# Patient Record
Sex: FEMALE | Race: WHITE
[De-identification: names, ages, dates, MRNs, and addresses within clinical notes are randomized per-mention and may not be internally consistent; named-entity substitution may affect disease eponyms.]

---

## 2019-04-16 ENCOUNTER — HOSPITAL ENCOUNTER (INPATIENT)
Dept: HOSPITAL 5 - TRG | Age: 24
LOS: 2 days | Discharge: HOME | End: 2019-04-18
Attending: OBSTETRICS & GYNECOLOGY | Admitting: OBSTETRICS & GYNECOLOGY
Payer: MEDICAID

## 2019-04-16 DIAGNOSIS — Z3A.40: ICD-10-CM

## 2019-04-16 LAB
HCT VFR BLD CALC: 42.1 % (ref 30.3–42.9)
HGB BLD-MCNC: 13.6 GM/DL (ref 10.1–14.3)
MCHC RBC AUTO-ENTMCNC: 32 % (ref 30–34)
MCV RBC AUTO: 81 FL (ref 79–97)
PLATELET # BLD: 216 K/MM3 (ref 140–440)
RBC # BLD AUTO: 5.22 M/MM3 (ref 3.65–5.03)

## 2019-04-16 PROCEDURE — 96372 THER/PROPH/DIAG INJ SC/IM: CPT

## 2019-04-16 PROCEDURE — 86901 BLOOD TYPING SEROLOGIC RH(D): CPT

## 2019-04-16 PROCEDURE — 86900 BLOOD TYPING SEROLOGIC ABO: CPT

## 2019-04-16 PROCEDURE — 36415 COLL VENOUS BLD VENIPUNCTURE: CPT

## 2019-04-16 PROCEDURE — 85018 HEMOGLOBIN: CPT

## 2019-04-16 PROCEDURE — 86592 SYPHILIS TEST NON-TREP QUAL: CPT

## 2019-04-16 PROCEDURE — 85014 HEMATOCRIT: CPT

## 2019-04-16 PROCEDURE — 85027 COMPLETE CBC AUTOMATED: CPT

## 2019-04-16 PROCEDURE — 86850 RBC ANTIBODY SCREEN: CPT

## 2019-04-16 PROCEDURE — 88307 TISSUE EXAM BY PATHOLOGIST: CPT

## 2019-04-16 NOTE — PROGRESS NOTE
Assessment and Plan





A:


24 yo,  @ 40.1 wks gestation


O+ bld type


GBS negative


Active labor


Category 1 FHTs





P:


Continue routine labor orders


AROM- moderate meconium noted


Pitocin augmentation after 1 hr 


Anticipate 


NICU at delivery








Subjective





- Subjective


Date of service: 19 ()


Principal diagnosis: Active labor


Interval history: 





24 yo,  @ 40.1 wks gestation, pt of [facility], sent to [facility]

from clinic for ctx and cervical dilation. PNC was initiated at 8.1 wks 

gestation. Pregnancy has been uncomplicated.  Labs: O+; Antibody negative; 

Rubella immune; VDRL negative; HBsAg negative; HIV negative; GC/Chlamydia 

negative; GBS negative.


Patient reports: fetal movement normal, contractions, other ("Feels better after

epidural")





Objective





- Vital Signs


Vital Signs: 


                               Vital Signs - 12hr











  19





  14:30 14:35 14:40


 


Temperature   


 


Pulse Rate 75 78 71


 


Respiratory   





Rate   


 


Blood Pressure 117/78  


 


Blood Pressure   





[Left]   


 


O2 Sat by Pulse 97 99 99





Oximetry   














  19





  14:42 14:45 14:50


 


Temperature   


 


Pulse Rate 73 78 80


 


Respiratory   





Rate   


 


Blood Pressure   


 


Blood Pressure   





[Left]   


 


O2 Sat by Pulse 94 99 99





Oximetry   














  19





  14:52 14:55 17:51


 


Temperature 98.5 F  


 


Pulse Rate 79 77 


 


Respiratory 16  18





Rate   


 


Blood Pressure   


 


Blood Pressure 117/78  





[Left]   


 


O2 Sat by Pulse  98 





Oximetry   














  19





  18:44 18:49 18:54


 


Temperature   


 


Pulse Rate 89 91 H 96 H


 


Respiratory   





Rate   


 


Blood Pressure   


 


Blood Pressure   





[Left]   


 


O2 Sat by Pulse 99 99 99





Oximetry   














  19





  18:56 18:59 19:01


 


Temperature   


 


Pulse Rate 96 H 95 H 84


 


Respiratory   





Rate   


 


Blood Pressure 112/68  101/58


 


Blood Pressure   





[Left]   


 


O2 Sat by Pulse  99 





Oximetry   














  19





  19:04 19:06 19:09


 


Temperature   


 


Pulse Rate 80 82 83


 


Respiratory   





Rate   


 


Blood Pressure  99/57 


 


Blood Pressure   





[Left]   


 


O2 Sat by Pulse 99  99





Oximetry   














  19/19





  19:12 19:14 19:17


 


Temperature   


 


Pulse Rate 82 89 78


 


Respiratory   





Rate   


 


Blood Pressure 107/55  116/65


 


Blood Pressure   





[Left]   


 


O2 Sat by Pulse  98 





Oximetry   














  19





  19:19 19:22 19:24


 


Temperature   


 


Pulse Rate 105 H 83 89


 


Respiratory   





Rate   


 


Blood Pressure  115/65 


 


Blood Pressure   





[Left]   


 


O2 Sat by Pulse 99  99





Oximetry   














  19





  19:29 19:33 19:34


 


Temperature   


 


Pulse Rate 89 89 94 H


 


Respiratory   





Rate   


 


Blood Pressure 113/67 122/68 


 


Blood Pressure   





[Left]   


 


O2 Sat by Pulse 99  100





Oximetry   














  19





  19:36


 


Temperature 


 


Pulse Rate 92 H


 


Respiratory 





Rate 


 


Blood Pressure 109/64


 


Blood Pressure 





[Left] 


 


O2 Sat by Pulse 





Oximetry 














- Exam


Breasts: deferred


Cardiovascular: Regular rate


Lungs: Normal air movement


Uterus: Present: other (S=D)


FHR: category 1


Uterine Contraction Monitor Mode: External


Cervical Dilatation: 5


Cervical Effacement Percentage: 80


Fetal station: 0-1


Uterine Contraction Frequency (min): 3-4


Uterine Contraction Pattern: Irregular


Uterine Tone Measurement Phase: Resting


Uterine Contraction Intensity: Moderate


Deep Tendon Reflex Grade: Normal +2





- Labs


Labs: 


                                  Abnormal Labs











  19





  16:45


 


WBC  14.7 H


 


RBC  5.22 H


 


MCH  26 L








                         Laboratory Results - last 24 hr











  19





  16:45 16:45


 


WBC  14.7 H 


 


RBC  5.22 H 


 


Hgb  13.6 


 


Hct  42.1 


 


MCV  81 


 


MCH  26 L 


 


MCHC  32 


 


RDW  14.5 


 


Plt Count  216 


 


Blood Type   O POSITIVE


 


Antibody Screen   Negative

## 2019-04-16 NOTE — HISTORY AND PHYSICAL REPORT
History of Present Illness


Date of examination: 19 (1750)


Date of admission: 


19 16:17





Chief complaint: 





Active labor


History of present illness: 





24 yo,  @ 40.1 wks gestation, pt of [facility], sent to [facility]

from clinic for ctx and cervical dilation. PNC was initiated at 8.1 wks 

gestation. Pregnancy has been uncomplicated.  Labs: O+; Antibody negative; 

Rubella immune; VDRL negative; HBsAg negative; HIV negative; GC/Chlamydia 

negative; GBS negative.





Past History


Past Medical History: no pertinent history


Past Surgical History: no surgical history


GYN History: abnormal PAP smear


Family/Genetic History: none


Social history: , lives with family, full code





- Obstetrical History


Expected Date of Delivery: 04/15/19


Actual Gestation: 40 Week(s) 1 Day(s) 


: 1


Para: 0





Medications and Allergies


                                    Allergies











Allergy/AdvReac Type Severity Reaction Status Date / Time


 


No Known Allergies Allergy   Unverified 19 17:17











Active Meds: 


Active Medications





Ephedrine Sulfate (Ephedrine Sulfate)  10 mg IV Q2M PRN


   PRN Reason: Hypotension


Fentanyl (Sublimaze)  100 mcg IV Q2H PRN


   PRN Reason: Labor Pain


Lactated Ringer's (Lactated Ringers)  1,000 mls @ 125 mls/hr IV AS DIRECT CASIMIRO


Oxytocin/Sodium Chloride (Pitocin/Ns 20 Unit/1000ml Drip)  20 units in 1,000 mls

@ 125 mls/hr IV AS DIRECT CASIMIRO


Oxytocin/Sodium Chloride (Pitocin/Ns 30 Unit/500ml)  30 units in 500 mls @ 4 

mls/hr IV TITR CASIMIRO; Protocol


Lidocaine (Xylocaine 2%)  20 ml INFILTRATI ONCE ONE


   Stop: 19 17:30


Mineral Oil (Mineral Oil)  30 ml PO QHS PRN


   PRN Reason: Constipation


Terbutaline Sulfate (Brethine)  0.25 mg IVP ONCE PRN


   PRN Reason: Hyperstimulation/Hypertonicity











Review of Systems


All systems: negative


Gastrointestinal: abdominal pain (ctxs reported)





- Vital Signs


Vital signs: 


                                   Vital Signs











Pulse BP Pulse Ox


 


 75   117/78   97 


 


 19 14:30  19 14:30  19 14:30








                                        











Temp Pulse Resp BP Pulse Ox


 


 98.5 F   77   16   117/78   98 


 


 19 14:52  19 14:55  19 14:52  19 14:52  19 14:55














- Physical Exam


Breasts: Positive: deferred


Cardiovascular: Regular rate, Normal S1, Normal S2


Lungs: Positive: Normal air movement


Abdomen: Positive: other (gravid)


Uterus: Positive: other (S=D)


Extremities: Positive: normal


Deep Tendon Reflex Grade: Normal +2





- Obstetrical


FHR: category 1


Uterine Contraction Monitor Mode: External


Cervical Dilatation: 4


Cervical Effacement Percentage: 70


Fetal station: -2


Uterine Contraction Frequency (min): 3-4


Uterine Contraction Pattern: Irregular


Uterine Tone Measurement Phase: Resting


Uterine Contraction Intensity: Moderate





Results


All other labs normal.








Assessment and Plan





A:


24 yo,  @ 40.1 wks gestation


O+ bld type


GBS negative


Active labor





P:


Admit to L & D


Epidural as desired


Anticipate

## 2019-04-17 LAB
HCT VFR BLD CALC: 32.4 % (ref 30.3–42.9)
HGB BLD-MCNC: 10.5 GM/DL (ref 10.1–14.3)

## 2019-04-17 PROCEDURE — 00HU33Z INSERTION OF INFUSION DEVICE INTO SPINAL CANAL, PERCUTANEOUS APPROACH: ICD-10-PCS | Performed by: OBSTETRICS & GYNECOLOGY

## 2019-04-17 PROCEDURE — 3E0R3BZ INTRODUCTION OF ANESTHETIC AGENT INTO SPINAL CANAL, PERCUTANEOUS APPROACH: ICD-10-PCS | Performed by: OBSTETRICS & GYNECOLOGY

## 2019-04-17 PROCEDURE — 10907ZC DRAINAGE OF AMNIOTIC FLUID, THERAPEUTIC FROM PRODUCTS OF CONCEPTION, VIA NATURAL OR ARTIFICIAL OPENING: ICD-10-PCS | Performed by: OBSTETRICS & GYNECOLOGY

## 2019-04-17 PROCEDURE — 0KQM0ZZ REPAIR PERINEUM MUSCLE, OPEN APPROACH: ICD-10-PCS | Performed by: OBSTETRICS & GYNECOLOGY

## 2019-04-17 RX ADMIN — IBUPROFEN SCH MG: 600 TABLET, FILM COATED ORAL at 12:25

## 2019-04-17 RX ADMIN — Medication SCH EACH: at 10:01

## 2019-04-17 RX ADMIN — IBUPROFEN SCH MG: 600 TABLET, FILM COATED ORAL at 06:00

## 2019-04-17 RX ADMIN — IBUPROFEN SCH MG: 600 TABLET, FILM COATED ORAL at 23:33

## 2019-04-17 RX ADMIN — IBUPROFEN SCH MG: 600 TABLET, FILM COATED ORAL at 17:46

## 2019-04-17 NOTE — DISCHARGE SUMMARY
Providers





- Providers


Date of Admission: 


19 16:17





Date of discharge: 19


Attending physician: 


AMAYA GRIGGS





                                        





19 00:16


Consult to Lactation Consultant [CONS] Routine 


   Reason For Exam: assistance with breastfeeding, SNS











Primary care physician: 


AMAYA GRIGGS








Hospitalization


Reason for admission: active labor


Delivery: 


Episiotomy: none


Laceration: 2nd degree


Other postpartum procedures: none


Postpartum complications: none


Discharge diagnosis: IUP at term delivered


[place] baby: female


Condition at discharge: Good


Disposition: DC-01 TO HOME OR SELFCARE





Plan





- Provider Discharge Summary


Activity: routine, no sex for 6 weeks, no heavy lifting 4 weeks, no strenuous 

exercise


Diet: routine


Instructions: routine


Additional instructions: 


[]  Smoking cessation referral if applicable(refer to patient education folder 

for contact #)


[]  Refer to [facility] Booklet








Call your doctor immediately for:


* Fever > 100.5


* Heavy vaginal bleeding ( >1 pad per hour)


* Severe persistent headache


* Shortness of breath


* Reddened, hot, painful area to leg or breast


* Drainage or odor from incision.





* Keep incision clean and dry at all times and follow doctor's instructions 

regarding bathing/showering











- Follow up plan


Follow up: 


AMAYA GRIGGS MD [Primary Care Provider] - 6 Weeks

## 2019-04-17 NOTE — PROCEDURE NOTE
OB Delivery Note





- Delivery


Date of Delivery: 19 (182)


Surgeon: JIMMY GOMEZ (CNM)


Estimated blood loss: other (450 cc)





- Vaginal


Delivery presentation: vertex


Delivery position: OA


Intrapartum events: meconium


Delivery induction: AROM


Delivery augmentation: pitocin


Delivery monitor: external FHT, external uterine


Route of delivery: 


Delivery placenta: spontaneous (), other (Sent to pathology)


Delivery cord: 3 umbilical vessels


Episiotomy: none


Delivery laceration: 2nd degree (perineal), vaginal side wall (some vaginal 

shreading with weeping, vaginal packing in place x 2 hrs, then remove)


Delivery repair: vicryl (3.0)


Anesthesia: epidural


Delivery comments: 





Pt complete and pushing, delivered viable crying female infant, placed directly 

to maternal abdomen. NICU at bedside secondary to meconium stain. Cord double 

clamped and cut by FOB after 3 mins. Placenta spontaneously delivered, nitin. 

Sent to pathology for meconium stain and yellow fluid filled sac noted.  Second 

degree perineal laceration, repaired.  Shredded areas noted along vaginal wall 

with continuous bleeding noted.  Vaginal packing in place x 2 hrs, then remove. 

Baby and mother stable and safe, bonding well.   





- Infant


  ** A


Apgar at 1 minute: 8


Apgar at 5 minutes: 9


Infant Gender: Female (Weight: 3113 (6 lbs 14 oz), 18.5 inches.)

## 2019-04-17 NOTE — PROGRESS NOTE
Assessment and Plan


A: PP day #1


     Stable





P: Follow Routine Postpartum Orders


    D/c Home today per patient request


    RTO in 6 weeks


                          





Subjective





- Subjective


Date of service: 19


Principal diagnosis: Active labor


Patient reports: appetite normal, voiding normally, pain well controlled, 

flatus, ambulating normally


[place]: doing well





Objective





- Vital Signs


Latest vital signs: 


                                   Vital Signs











  Temp Pulse Resp BP BP Pulse Ox


 


 19 07:26  98.4 F  81  18   104/54 


 


 19 03:43  98.3 F   18  114/63  


 


 19 02:13   100 H   99/54  


 


 19 01:58   103 H   99/55  


 


 19 01:43   112 H   102/57  


 


 19 01:29   107 H   107/57  


 


 19 01:00  98.6 F  78  18   110/78 


 


 19 00:58   100 H   134/61  


 


 19 00:45  97.8 F  78  20   112/80 


 


 19 00:44   117 H   147/58  


 


 19 00:30  97.8 F  76  20   99/76 


 


 19 00:28   93 H   94/53  


 


 19 00:14  98.7 F   20   


 


 19 00:13   112 H   109/68  


 


 19 00:00  98.6 F   22   


 


 19 23:46   91 H   100/55  


 


 19 23:41   97 H   97/51  


 


 19 23:36   95 H   97/50  


 


 19 23:31   108 H   96/50  


 


 19 23:26   115 H   95/51  


 


 19 23:22   117 H   102/52  


 


 19 23:17   109 H   137/61  


 


 19 23:12   138 H   145/81   100


 


 19 23:09   141 H   106/64  


 


 19 23:07   117 H     100


 


 19 23:03   114 H   104/62  


 


 19 23:02   115 H     97


 


 19 22:57   159 H     98


 


 19 22:56   105 H   104/54  


 


 19 22:52   136 H   105/58   98


 


 19 22:50   62     91


 


 19 22:47   96 H     95


 


 19 22:46   92 H   115/59  


 


 19 22:43   142 H   129/63  


 


 19 22:42   144 H     97


 


 19 22:37   92 H   122/77   98


 


 19 22:33   79   102/54  


 


 19 22:32   85     96


 


 19 22:28   77   105/52  


 


 19 22:27   77     97


 


 19 22:22   85   107/54   97


 


 19 22:19   80   106/51  


 


 19 22:17   82     96


 


 19 22:12   88   107/56   98


 


 19 22:07   89     98


 


 19 22:06   83   94/55  


 


 19 22:03   79   102/55  


 


 19 22:02   56 L     97


 


 19 21:57   78   109/58   96


 


 19 21:52   82     97


 


 19 21:51   94 H   103/62  


 


 19 21:47   88     97


 


 19 21:46   78   102/57  


 


 19 21:44   82   108/58  


 


 19 21:42   91 H     97


 


 19 21:37   92 H     97


 


 19 21:36   92 H   105/59  


 


 19 21:32   89   102/57   97


 


 19 21:29   92 H   108/52  


 


 19 21:27   79     97


 


 19 21:23   94 H   109/53  


 


 19 21:22   87     97


 


 19 21:17   80   107/57  


 


 19 21:13   95 H   104/57  


 


 19 21:07   77   107/62  


 


 19 21:02   88   113/62  


 


 19 20:56   85   113/70  


 


 19 20:53   84   108/67  


 


 19 20:48   90   106/65  


 


 19 20:43   88   118/58  


 


 19 20:36   86   104/59  


 


 19 20:31   86   102/61  


 


 19 20:29   91 H     99


 


 19 20:27   91 H   108/64  


 


 19 20:24   101 H     98


 


 19 20:21   91 H   102/63  


 


 19 20:19   92 H     100


 


 19 20:16   90   102/61  


 


 19 20:14   91 H     100


 


 19 20:12   90   104/64  


 


 19 20:09   92 H     100


 


 19 20:08   90   110/66  


 


 19 20:07   98 H   114/73  


 


 19 20:04   95 H     100


 


 19 20:01   93 H   105/63  


 


 19 19:59   91 H     100


 


 19 19:56   89   106/57  


 


 19 19:54   97 H     99


 


 19 19:51   86   100/56  


 


 19 19:49   93 H     98


 


 19 19:46   85   102/57  


 


 19 19:44   85     98


 


 19 19:41   90   103/59  


 


 19 19:39   92 H     99


 


 19 19:36   92 H   109/64  


 


 19 19:34   94 H     100


 


 19 19:33   89   122/68  


 


 19 19:29   89   113/67   99


 


 19 19:24   89     99


 


 19 19:22   83   115/65  


 


 19 19:19   105 H     99


 


 19 19:17   78   116/65  


 


 19 19:14   89     98


 


 19 19:12   82   107/55  


 


 19 19:09   83     99


 


 19 19:06   82   99/57  


 


 19 19:04   80     99


 


 19 19:01   84   101/58  


 


 19 18:59   95 H     99


 


 19 18:56   96 H   112/68  


 


 19 18:54   96 H     99


 


 19 18:49   91 H     99


 


 19 18:44   89     99


 


 19 17:51    18   


 


 19 14:55   77     98


 


 19 14:52  98.5 F  79  16   117/78 


 


 19 14:50   80     99


 


 19 14:45   78     99


 


 19 14:42   73     94


 


 19 14:40   71     99


 


 19 14:35   78     99


 


 19 14:30   75   117/78   97








                                Intake and Output











 19





 22:59 06:59 14:59


 


Intake Total  360 480


 


Output Total 600 1400 


 


Balance -600 -1040 480


 


Intake:   


 


  Oral  360 480


 


Output:   


 


  Urine 600 1400 


 


    Indwelling Catheter 600  


 


    Void  1400 


 


Other:   


 


  Total, Intake Amount  360 480


 


  Total, Output Amount 600 500 


 


  # Voids   


 


    Indwelling Catheter 0  


 


    Void   1


 


  Weight 69.539 kg  


 


  Estimated Blood Loss  450 














- Exam


Breasts: Present: normal


Cardiovascular: Present: Regular rate


Lungs: Present: Clear to auscultation, Normal air movement


Abdomen: Present: normal appearance, soft, normal bowel sounds


Uterus: Present: normal, firm, fundal height below umbilicus


Extremities: Present: normal





- Labs


Labs: 


                              Abnormal lab results











  19 Range/Units





  16:45 


 


WBC  14.7 H  (4.5-11.0)  K/mm3


 


RBC  5.22 H  (3.65-5.03)  M/mm3


 


MCH  26 L  (28-32)  pg

## 2019-04-18 VITALS — DIASTOLIC BLOOD PRESSURE: 62 MMHG | SYSTOLIC BLOOD PRESSURE: 109 MMHG

## 2019-04-18 RX ADMIN — Medication SCH EACH: at 09:38

## 2019-04-18 RX ADMIN — IBUPROFEN SCH MG: 600 TABLET, FILM COATED ORAL at 05:51
